# Patient Record
Sex: MALE | Race: WHITE | Employment: OTHER | URBAN - METROPOLITAN AREA
[De-identification: names, ages, dates, MRNs, and addresses within clinical notes are randomized per-mention and may not be internally consistent; named-entity substitution may affect disease eponyms.]

---

## 2021-04-20 ENCOUNTER — PATIENT OUTREACH (OUTPATIENT)
Dept: CARE COORDINATION | Facility: CLINIC | Age: 71
End: 2021-04-20

## 2021-04-20 ENCOUNTER — HOSPITAL ENCOUNTER (EMERGENCY)
Facility: CLINIC | Age: 71
Discharge: HOME | End: 2021-04-20
Attending: EMERGENCY MEDICINE | Admitting: EMERGENCY MEDICINE
Payer: COMMERCIAL

## 2021-04-20 ENCOUNTER — APPOINTMENT (OUTPATIENT)
Dept: GENERAL RADIOLOGY | Facility: CLINIC | Age: 71
End: 2021-04-20
Attending: EMERGENCY MEDICINE
Payer: COMMERCIAL

## 2021-04-20 VITALS
SYSTOLIC BLOOD PRESSURE: 136 MMHG | HEART RATE: 71 BPM | TEMPERATURE: 97.1 F | DIASTOLIC BLOOD PRESSURE: 79 MMHG | OXYGEN SATURATION: 100 % | RESPIRATION RATE: 12 BRPM

## 2021-04-20 DIAGNOSIS — U07.1 COVID-19: ICD-10-CM

## 2021-04-20 LAB
ANION GAP SERPL CALCULATED.3IONS-SCNC: 5 MMOL/L (ref 3–14)
BASOPHILS # BLD AUTO: 0 10E9/L (ref 0–0.2)
BASOPHILS NFR BLD AUTO: 0.3 %
BUN SERPL-MCNC: 19 MG/DL (ref 7–30)
CALCIUM SERPL-MCNC: 8.5 MG/DL (ref 8.5–10.1)
CHLORIDE SERPL-SCNC: 101 MMOL/L (ref 94–109)
CO2 SERPL-SCNC: 29 MMOL/L (ref 20–32)
CREAT SERPL-MCNC: 0.99 MG/DL (ref 0.66–1.25)
DIFFERENTIAL METHOD BLD: ABNORMAL
EOSINOPHIL # BLD AUTO: 0 10E9/L (ref 0–0.7)
EOSINOPHIL NFR BLD AUTO: 0.3 %
ERYTHROCYTE [DISTWIDTH] IN BLOOD BY AUTOMATED COUNT: 13.3 % (ref 10–15)
FLUAV RNA RESP QL NAA+PROBE: NEGATIVE
FLUBV RNA RESP QL NAA+PROBE: NEGATIVE
GFR SERPL CREATININE-BSD FRML MDRD: 78 ML/MIN/{1.73_M2}
GLUCOSE SERPL-MCNC: 104 MG/DL (ref 70–99)
HCT VFR BLD AUTO: 40.9 % (ref 40–53)
HGB BLD-MCNC: 13.5 G/DL (ref 13.3–17.7)
IMM GRANULOCYTES # BLD: 0 10E9/L (ref 0–0.4)
IMM GRANULOCYTES NFR BLD: 0.5 %
INTERPRETATION ECG - MUSE: NORMAL
LABORATORY COMMENT REPORT: ABNORMAL
LYMPHOCYTES # BLD AUTO: 0.6 10E9/L (ref 0.8–5.3)
LYMPHOCYTES NFR BLD AUTO: 15.8 %
MCH RBC QN AUTO: 31.3 PG (ref 26.5–33)
MCHC RBC AUTO-ENTMCNC: 33 G/DL (ref 31.5–36.5)
MCV RBC AUTO: 95 FL (ref 78–100)
MONOCYTES # BLD AUTO: 0.4 10E9/L (ref 0–1.3)
MONOCYTES NFR BLD AUTO: 10.6 %
NEUTROPHILS # BLD AUTO: 2.7 10E9/L (ref 1.6–8.3)
NEUTROPHILS NFR BLD AUTO: 72.5 %
NRBC # BLD AUTO: 0 10*3/UL
NRBC BLD AUTO-RTO: 0 /100
PLATELET # BLD AUTO: 188 10E9/L (ref 150–450)
POTASSIUM SERPL-SCNC: 4 MMOL/L (ref 3.4–5.3)
RBC # BLD AUTO: 4.32 10E12/L (ref 4.4–5.9)
RSV RNA SPEC QL NAA+PROBE: ABNORMAL
SARS-COV-2 RNA RESP QL NAA+PROBE: POSITIVE
SODIUM SERPL-SCNC: 135 MMOL/L (ref 133–144)
SPECIMEN SOURCE: ABNORMAL
TROPONIN I SERPL-MCNC: <0.015 UG/L (ref 0–0.04)
WBC # BLD AUTO: 3.7 10E9/L (ref 4–11)

## 2021-04-20 PROCEDURE — 80048 BASIC METABOLIC PNL TOTAL CA: CPT | Performed by: EMERGENCY MEDICINE

## 2021-04-20 PROCEDURE — 85025 COMPLETE CBC W/AUTO DIFF WBC: CPT | Performed by: EMERGENCY MEDICINE

## 2021-04-20 PROCEDURE — C9803 HOPD COVID-19 SPEC COLLECT: HCPCS

## 2021-04-20 PROCEDURE — 84484 ASSAY OF TROPONIN QUANT: CPT | Performed by: EMERGENCY MEDICINE

## 2021-04-20 PROCEDURE — 71045 X-RAY EXAM CHEST 1 VIEW: CPT

## 2021-04-20 PROCEDURE — 93005 ELECTROCARDIOGRAM TRACING: CPT

## 2021-04-20 PROCEDURE — 87636 SARSCOV2 & INF A&B AMP PRB: CPT | Performed by: EMERGENCY MEDICINE

## 2021-04-20 PROCEDURE — 99285 EMERGENCY DEPT VISIT HI MDM: CPT | Mod: 25

## 2021-04-20 ASSESSMENT — ENCOUNTER SYMPTOMS
SORE THROAT: 0
PALPITATIONS: 0
DIAPHORESIS: 1
SHORTNESS OF BREATH: 1
APPETITE CHANGE: 1
COUGH: 1
DIZZINESS: 1
ABDOMINAL PAIN: 0
FATIGUE: 1
HEADACHES: 0
NAUSEA: 0
DIARRHEA: 0
VOMITING: 0

## 2021-04-20 NOTE — ED PROVIDER NOTES
"  History   Chief Complaint:  Shortness of Breath and Dizziness     HPI   Irwin Chairez is a 70 year old male with history of hyperlipidemia who presents with shortness of breath and dizziness. The patient reports a \"head cold\" with cough, fatigue, decreased appetite, and shortness of breath for the past 10 days. This morning, he had an episode of dizziness and diaphoresis, during which he felt he was going to pass out. Here he states he feels better, and denies any chest pain, palpitations, headache, nausea, vomiting, diarrhea, or abdominal pain. He further denies any facial pain, sore throat, change in taste/smell, or history of sinus infections. He also denies any COVID exposure and state he has not been vaccinated. Reports he has been staying hydrated. No cardiac history.     Review of Systems   Constitutional: Positive for appetite change, diaphoresis and fatigue.   HENT: Negative for sore throat.         No facial pain or change in taste/smell   Respiratory: Positive for cough and shortness of breath.    Cardiovascular: Negative for chest pain and palpitations.   Gastrointestinal: Negative for abdominal pain, diarrhea, nausea and vomiting.   Neurological: Positive for dizziness. Negative for headaches.   All other systems reviewed and are negative.    Allergies:  Sulfa    Medications:  The patient is currently on no regular medications.    Past Medical History:    Reflux  Hyperlipidemia  Anemia  Colon polyp  Skin cancer  BPH  Inguinal hernia     Past Surgical History:    Colonoscopy x3  Foreign body removal left eye   Subtalar arthroereisis  Endoscopy GI  Knee arthroscopy   Hernia repair  Sigmoidoscopy   EGD x2    Family History:    Heart disease  DM    Social History:  Patient presents alone.  PCP: Dr. Schoeneman     Physical Exam     Patient Vitals for the past 24 hrs:   BP Temp Temp src Pulse Resp SpO2   04/20/21 1100 120/80 -- -- 70 15 --   04/20/21 1030 115/79 -- -- 71 14 --   04/20/21 1023 -- -- Oral -- " -- --   04/20/21 1000 120/83 -- -- 65 10 --   04/20/21 0926 117/81 97.1  F (36.2  C) Temporal 67 14 100 %       Physical Exam  General: Very pleasant and talkative   Head: The scalp, face, and head appear normal  Eyes: The pupils are equal, round, and reactive to light. Conjunctivae and sclerae are normal  Neck: Normal range of motion.   CV: Regular rate and rhythm.   Resp: Lungs are clear without wheezes or rales. No respiratory distress. No tachypnea.   GI: Abdomen is soft, no rigidity, guarding, or rebound. No distension. No tenderness to palpation in any quadrant.     MS: Normal tone. Joints grossly normal without effusions. No asymmetric leg swelling, calf or thigh tenderness.    Skin: No rash or lesions noted. Normal capillary refill noted  Neuro: Speech is normal and fluent. Face is symmetric. Moving all extremities.   Psych:  Normal affect.  Appropriate interactions.    Emergency Department Course     ECG  ECG taken at 0940, ECG read at 0946  NSR, Normal ECG  Rate 64 bpm. ME interval 164 ms. QRS duration 90 ms. QT/QTc 398/410 ms. P-R-T axes 36 -23 15.     Imaging:    XR Chest Port 1 View:  Mild interstitial infiltrates in both lung bases,   suspicious for Covid 19 pneumonia, as per radiology.     Laboratory:    CBC: WBC: 3.7 (L), HGB: 13.5, PLT: 188  BMP: Glucose 104 (H), o/w WNL (Creatinine: 0.99)  Troponin (Collected 1003): <0.015  Symptomatic Influenza A/B & COVID PCR: Positive for COVID    Emergency Department Course:    Reviewed:  I reviewed the patient's nursing notes, vitals, past medical records, Care Everywhere.     Assessments:  0953    I evaluated the patient and performed an exam as above.    1121    I updated the patient on results and discussed plan of care.    Disposition:  The patient was discharged to home.     Impression & Plan     CMS Diagnoses: None    Medical Decision Making:  Irwin Chairez is a 70 year old male who presents for evaluation of shortness of breath and dizziness. The  "patient reports a \"head cold\" with cough, fatigue, decreased appetite, and shortness of breath for the past 10 days. COVID testing is positive today. Remainder of work up is reassuring. Given that the patient is otherwise hemodynamically stable without significant hypoxia, I do not believe that the patient requires admission here today. Return to the ED for high fevers > 103 for more than 48 hours more, increasing productive cough, shortness of breath, or confusion.  There is no signs of serious bacterial infection such as bacteremia, meningitis, UTI/pyelonephritis, strep pharyngitis, etc. I discussed my findings and plan with the patient and they are amenable at this time.  All questions were answered and patient will be discharged home in stable condition.     Covid-19  Irwin Chairez was evaluated during a global COVID-19 pandemic, which necessitated consideration that the patient might be at risk for infection with the SARS-CoV-2 virus that causes COVID-19.   Applicable protocols for evaluation were followed during the patient's care.   COVID-19 was considered as part of the patient's evaluation. The plan for testing is:  a test was obtained during this visit.    Diagnosis:    ICD-10-CM    1. COVID-19  U07.1 COVID-19 [facility] Referral     Care Coordination Referral       Scribe Disclosure:  I, Eveline Pedroza, am serving as a scribe at 9:53 AM on 4/20/2021 to document services personally performed by Skyler Riddle MD based on my observations and the provider's statements to me.      Skyler Riddle MD  04/20/21 1434    "

## 2021-04-20 NOTE — PROGRESS NOTES
Clinic Care Coordination Contact    Care Coordination ED Discharge Follow up Note    Patient referred for Virtual Home Monitoring Program for COVID-19. Called patient to complete assessment, verify or assist with scheduling follow up appointment with patient's PCP, offer Clinic Care Coordination services and ensure patient is engaged with the [facility].     Called patient x1. Unable to leave message as voicemail not set up.    RN to attempt again tomorrow.    MAYLIN CerdaN, RN   [facility]  - [facility] Care Coordinator

## 2021-04-21 ENCOUNTER — PATIENT OUTREACH (OUTPATIENT)
Dept: CARE COORDINATION | Facility: CLINIC | Age: 71
End: 2021-04-21

## 2021-04-21 NOTE — PROGRESS NOTES
Second phone attempt to reach pt on home virtual monitoring program for COVID-19, no answer. Left message asking pt to make f/u appt for tomorrow and to participate in [facility].